# Patient Record
Sex: FEMALE | URBAN - METROPOLITAN AREA
[De-identification: names, ages, dates, MRNs, and addresses within clinical notes are randomized per-mention and may not be internally consistent; named-entity substitution may affect disease eponyms.]

---

## 2020-03-10 ENCOUNTER — OFFICE VISIT (OUTPATIENT)
Dept: URBAN - METROPOLITAN AREA CLINIC 71 | Facility: CLINIC | Age: 65
End: 2020-03-10
Payer: COMMERCIAL

## 2020-03-10 DIAGNOSIS — H25.813 COMBINED FORMS OF AGE-RELATED CATARACT, BILATERAL: ICD-10-CM

## 2020-03-10 DIAGNOSIS — H52.4 PRESBYOPIA: Primary | ICD-10-CM

## 2020-03-10 PROCEDURE — 92014 COMPRE OPH EXAM EST PT 1/>: CPT | Performed by: OPHTHALMOLOGY

## 2020-03-10 ASSESSMENT — KERATOMETRY
OD: 45.50
OS: 46.00

## 2020-03-10 ASSESSMENT — VISUAL ACUITY
OD: 20/20
OS: 20/25

## 2020-03-10 ASSESSMENT — INTRAOCULAR PRESSURE
OS: 14
OD: 14

## 2020-03-10 NOTE — IMPRESSION/PLAN
Impression: Combined forms of age-related cataract, bilateral: H25.813. Plan: - early stage not treatment needed at this time.

## 2020-03-10 NOTE — IMPRESSION/PLAN
Impression: Presbyopia: H52.4. Plan: glasses rx given today Dr Abhilash Fregoso would like pt to meet with [facility] for CL fitting for distance for part time wear- but also to look at glasses for her self.

## 2020-03-16 ENCOUNTER — Encounter (OUTPATIENT)
Dept: URBAN - METROPOLITAN AREA OPTICAL 27 | Facility: OPTICAL | Age: 65
End: 2020-03-16

## 2020-03-16 PROCEDURE — V2020 VISION SVCS FRAMES PURCHASES: HCPCS | Performed by: OPTOMETRIST

## 2020-03-16 PROCEDURE — V2783 LENS, >= 1.66 P/>=1.80 G: HCPCS | Performed by: OPTOMETRIST

## 2020-03-16 PROCEDURE — V2781 PROGRESSIVE LENS PER LENS: HCPCS | Performed by: OPTOMETRIST

## 2020-03-16 PROCEDURE — V2744 TINT PHOTOCHROMATIC LENS/ES: HCPCS | Performed by: OPTOMETRIST

## 2020-03-16 PROCEDURE — V2750 ANTI-REFLECTIVE COATING: HCPCS | Performed by: OPTOMETRIST

## 2022-07-12 ENCOUNTER — OFFICE VISIT (OUTPATIENT)
Dept: URBAN - METROPOLITAN AREA CLINIC 71 | Facility: CLINIC | Age: 67
End: 2022-07-12
Payer: COMMERCIAL

## 2022-07-12 DIAGNOSIS — H04.123 DRY EYE SYNDROME OF BILATERAL LACRIMAL GLANDS: ICD-10-CM

## 2022-07-12 DIAGNOSIS — H25.13 AGE-RELATED NUCLEAR CATARACT, BILATERAL: Primary | ICD-10-CM

## 2022-07-12 DIAGNOSIS — H52.4 PRESBYOPIA: ICD-10-CM

## 2022-07-12 PROCEDURE — 92002 INTRM OPH EXAM NEW PATIENT: CPT

## 2022-07-12 ASSESSMENT — INTRAOCULAR PRESSURE
OS: 16
OD: 15

## 2022-07-12 ASSESSMENT — VISUAL ACUITY
OD: 20/20
OS: 20/20

## 2024-11-15 ENCOUNTER — OFFICE VISIT (OUTPATIENT)
Dept: URBAN - METROPOLITAN AREA CLINIC 71 | Facility: CLINIC | Age: 69
End: 2024-11-15
Payer: COMMERCIAL

## 2024-11-15 DIAGNOSIS — H52.4 PRESBYOPIA: Primary | ICD-10-CM

## 2024-11-15 DIAGNOSIS — H25.813 COMBINED FORMS OF AGE-RELATED CATARACT, BILATERAL: ICD-10-CM

## 2024-11-15 PROCEDURE — 92012 INTRM OPH EXAM EST PATIENT: CPT

## 2024-11-15 ASSESSMENT — INTRAOCULAR PRESSURE
OS: 15
OD: 13